# Patient Record
Sex: MALE | Race: ASIAN | HISPANIC OR LATINO | Employment: FULL TIME | URBAN - METROPOLITAN AREA
[De-identification: names, ages, dates, MRNs, and addresses within clinical notes are randomized per-mention and may not be internally consistent; named-entity substitution may affect disease eponyms.]

---

## 2017-04-24 ENCOUNTER — HOSPITAL ENCOUNTER (EMERGENCY)
Facility: HOSPITAL | Age: 52
Discharge: HOME | End: 2017-04-24
Attending: EMERGENCY MEDICINE
Payer: COMMERCIAL

## 2017-04-24 ENCOUNTER — TELEPHONE (OUTPATIENT)
Dept: OPHTHALMOLOGY | Facility: CLINIC | Age: 52
End: 2017-04-24

## 2017-04-24 VITALS
BODY MASS INDEX: 27.31 KG/M2 | DIASTOLIC BLOOD PRESSURE: 82 MMHG | HEIGHT: 64 IN | OXYGEN SATURATION: 99 % | WEIGHT: 160 LBS | RESPIRATION RATE: 18 BRPM | HEART RATE: 64 BPM | SYSTOLIC BLOOD PRESSURE: 132 MMHG | TEMPERATURE: 98 F

## 2017-04-24 DIAGNOSIS — S05.02XA BILATERAL CORNEAL ABRASIONS, INITIAL ENCOUNTER: Primary | ICD-10-CM

## 2017-04-24 DIAGNOSIS — S05.01XA BILATERAL CORNEAL ABRASIONS, INITIAL ENCOUNTER: Primary | ICD-10-CM

## 2017-04-24 PROCEDURE — 99283 EMERGENCY DEPT VISIT LOW MDM: CPT

## 2017-04-24 PROCEDURE — 25000003 PHARM REV CODE 250: Performed by: NURSE PRACTITIONER

## 2017-04-24 RX ORDER — ERYTHROMYCIN 5 MG/G
OINTMENT OPHTHALMIC
Qty: 1 TUBE | Refills: 0 | Status: SHIPPED | OUTPATIENT
Start: 2017-04-24

## 2017-04-24 RX ORDER — TETRACAINE HYDROCHLORIDE 5 MG/ML
2 SOLUTION OPHTHALMIC
Status: COMPLETED | OUTPATIENT
Start: 2017-04-24 | End: 2017-04-24

## 2017-04-24 RX ORDER — COLCHICINE 0.6 MG/1
0.6 TABLET ORAL DAILY
COMMUNITY

## 2017-04-24 RX ADMIN — FLUORESCEIN SODIUM 1 STRIP: 1 STRIP OPHTHALMIC at 08:04

## 2017-04-24 RX ADMIN — TETRACAINE HYDROCHLORIDE 2 DROP: 5 SOLUTION OPHTHALMIC at 08:04

## 2017-04-24 NOTE — ED TRIAGE NOTES
C/o eye burning, drainage, irritation princess at night x 3 months. Denies injury. Reports blurred vision.

## 2017-04-24 NOTE — DISCHARGE INSTRUCTIONS
Please return to the ED for any new or worsening symptoms: worsening vision or pain, chest pain, shortness of breath, loss of consciousness or any other concerns. Please follow up with ophthalmology within in the week. You may also call [phone] for the [facility] same day appointment line.

## 2017-04-24 NOTE — ED AVS SNAPSHOT
[facility]  [address] [place] [address]               Juan Titus   2017  8:28 AM   ED    Description:  Male : 1965   Department:  [facility]           Your Care was Coordinated By:     Provider Role From To    Petra Altman MD Attending Provider 17 --    Paola De Anda NP Nurse Practitioner 17 --      Reason for Visit     Eye Problem           Diagnoses this Visit        Comments    Bilateral corneal abrasions, initial encounter    -  Primary       ED Disposition     None           To Do List           Follow-up Information     Schedule an appointment as soon as possible for a visit with Rafa Colby MD.    Specialty:  Ophthalmology    Why:  Ophthalmology    Contact information:    [address] [place] [address] [phone]         These Medications        Disp Refills Start End    erythromycin (ROMYCIN) ophthalmic ointment 1 Tube 0 2017     Place a 1/2 inch ribbon of ointment into the lower eyelid to both eyes 3 times per day for 5 days      [facility]     [facility] Nurse Care Line -  Assistance  Unless otherwise directed by your provider, please contact [facility], our nurse care line that is available for  assistance.     Registered nurses in the [facility] provide: appointment scheduling, clinical advisement, health education, and other advisory services.  Call: [phone] (toll free)               Medications           Message regarding Medications     Verify the changes and/or additions to your medication regime listed below are the same as discussed with your clinician today.  If any of these changes or additions are incorrect, please notify your healthcare provider.        START taking these NEW medications        Refills    erythromycin (ROMYCIN) ophthalmic ointment 0    Sig: Place a 1/2 inch ribbon of ointment into the lower  "eyelid to both eyes 3 times per day for 5 days    Class: Print      These medications were administered today        Dose Freq    tetracaine HCl (PF) 0.5 % Drop 2 drop 2 drop ED 1 Time    Sig: Place 2 drops into the right eye ED 1 Time.    Class: Normal    Route: Right Eye    fluorescein ophthalmic strip 1 strip 1 strip ED 1 Time    Sig: Place 1 strip into the right eye ED 1 Time.    Class: Normal    Route: Right Eye           Verify that the below list of medications is an accurate representation of the medications you are currently taking.  If none reported, the list may be blank. If incorrect, please contact your healthcare provider. Carry this list with you in case of emergency.           Current Medications     colchicine 0.6 mg tablet Take 0.6 mg by mouth once daily.    erythromycin (ROMYCIN) ophthalmic ointment Place a 1/2 inch ribbon of ointment into the lower eyelid to both eyes 3 times per day for 5 days    ondansetron (ZOFRAN-ODT) 8 MG TbDL Take 1 tablet (8 mg total) by mouth every 6 (six) hours as needed.           Clinical Reference Information           Your Vitals Were     BP Pulse Temp Resp Height Weight    132/82 (BP Location: Right arm, Patient Position: Sitting) 64 97.8 °F (36.6 °C) (Oral) 18 5' 4" (1.626 m) 72.6 kg (160 lb)    SpO2 BMI             99% 27.46 kg/m2         Allergies as of 4/24/2017     No Known Allergies      Immunizations Administered on Date of Encounter - 4/24/2017     None      ED Micro, Lab, POCT     None      ED Imaging Orders     None        Discharge Instructions       Please return to the ED for any new or worsening symptoms: worsening vision or pain, chest pain, shortness of breath, loss of consciousness or any other concerns. Please follow up with ophthalmology within in the week. You may also call [phone] for the [facility] same day appointment line.    Discharge References/Attachments     CORNEAL ABRASION (ENGLISH)      Jossiessheela Sign-Up     Activating your " [facility] account is as easy as 1-2-3!     1) Visit [email or web address], select Sign Up Now, enter this activation code and your date of birth, then select Next.  [email or web address]  Expires: 6/8/2017  9:24 AM      2) Create a username and password to use when you visit [facility] in the future and select a security question in case you lose your password and select Next.    3) Enter your e-mail address and click Sign Up!    Additional Information  If you have questions, please e-mail [email or web address] or call [phone] to talk to our [facility] staff. Remember, [facility] is NOT to be used for urgent needs. For medical emergencies, dial 911.          [facility] complies with applicable Federal civil rights laws and does not discriminate on the basis of race, color, national origin, age, disability, or sex.        Language Assistance Services     ATTENTION: Language assistance services are available, free of charge. Please call [phone].      ATENCIÓN: Si habla español, tiene a fraser disposición servicios gratuitos de asistencia lingüística. Llame al [phone].     SAI Ý: N?u b?n nói Ti?ng Vi?t, có các d?ch v? h? tr? ngôn ng? mi?n phí dành cho b?n. G?i s? [phone].

## 2017-04-24 NOTE — ED PROVIDER NOTES
"Encounter Date: 4/24/2017    SCRIBE #1 NOTE: I, Debby Ferris, am scribing for, and in the presence of,  Paola De Anda NP. I have scribed the following portions of the note - Other sections scribed: HPI and ROS.       History     Chief Complaint   Patient presents with    Eye Problem     pt states " my eyes have been burning for 3 months"      Review of patient's allergies indicates:  No Known Allergies  HPI Comments: CC: Eye Problem    HPI: This 51 y.o male pt with a PMHx of gout presents to the ED with c/o bilateral eye pain with associated itching and redness. Symptoms are chronic, ongoing x3 months, and severe(10/10). Pain is described as itchy. Pt reports being seen at an outside facility for similar symptoms. Pt reports using eye drops, OTC allergy drops and prescribed unknown drops, without relief. He denies photophobia, headache, and light-headedness. There are no alleviating factors. Pt has no known allergies.     The history is provided by the patient. No  was used.     Past Medical History:   Diagnosis Date    Gout      Past Surgical History:   Procedure Laterality Date    APPENDECTOMY       Family History   Problem Relation Age of Onset    Family history unknown: Yes     Social History   Substance Use Topics    Smoking status: Never Smoker    Smokeless tobacco: None    Alcohol use No     Review of Systems   Constitutional: Negative for fever.   HENT: Negative for sore throat.    Eyes: Positive for pain, discharge, redness, itching and visual disturbance. Negative for photophobia.   Respiratory: Negative for shortness of breath.    Cardiovascular: Negative for chest pain.   Gastrointestinal: Negative for nausea.   Genitourinary: Negative for dysuria.   Musculoskeletal: Negative for back pain.   Skin: Negative for rash.   Neurological: Negative for weakness, light-headedness and headaches.   Hematological: Does not bruise/bleed easily.       Physical Exam   Initial " Vitals   BP Pulse Resp Temp SpO2   04/24/17 0817 04/24/17 0817 04/24/17 0817 04/24/17 0817 04/24/17 0817   132/82 64 18 97.8 °F (36.6 °C) 99 %     Physical Exam    Constitutional: Vital signs are normal. He appears well-developed and well-nourished.  Non-toxic appearance.   Eyes: Conjunctivae and EOM are normal. Pupils are equal, round, and reactive to light. Lids are everted and swept, no foreign bodies found. Right eye exhibits discharge (clear tearing). Right eye exhibits no exudate. No foreign body present in the right eye. Left eye exhibits no exudate. No foreign body present in the left eye.   Slit lamp exam:       The right eye shows corneal abrasion. The right eye shows no foreign body.        The left eye shows corneal abrasion. The left eye shows no foreign body.       Neck: Full passive range of motion without pain. Neck supple. No rigidity.   Cardiovascular: Normal rate, S1 normal, S2 normal and normal heart sounds. Exam reveals no gallop.    No murmur heard.  Pulmonary/Chest: Effort normal and breath sounds normal. No tachypnea. He has no decreased breath sounds. He has no wheezes. He has no rhonchi. He has no rales.   Abdominal: Soft. Normal appearance. There is no tenderness. There is no CVA tenderness, no tenderness at McBurney's point and negative Key's sign.   Neurological: He is alert and oriented to person, place, and time. GCS eye subscore is 4. GCS verbal subscore is 5. GCS motor subscore is 6.   Skin: Skin is warm, dry and intact. No rash noted.   Psychiatric: He has a normal mood and affect.         ED Course   Procedures  Labs Reviewed - No data to display          Medical Decision Making:   ED Management:  This is a 51 year old male who presents to the ED with complaints of bilateral eye pain, itching and tearing (R>L) x3 weeks. He reports no relief with allergy drops. On exam, there is no conjunctival injection or purulent drainage. On slit lamp examination with fluorescein staining,  there are bilateral corneal abrasions. No foreign bodies. Anterior chamber is clear. No evidence of angle closure glaucoma. Discharged home with prescription for erythromycin ointment and instructions for supportive care and follow up with ophthalmology. Return precautions given. Patient's case was discussed with Dr. Chan, who agrees with his plan of care.            Scribe Attestation:   Scribe #1: I performed the above scribed service and the documentation accurately describes the services I performed. I attest to the accuracy of the note.    Attending Attestation:     Physician Attestation Statement for NP/PA:   I discussed this assessment and plan of this patient with the NP/PA, but I did not personally examine the patient. The face to face encounter was performed by the NP/PA.    Other NP/PA Attestation Additions:      Medical Decision Makin y.o. male presents with bilateral eye pain and itching.  He notes this has been going on for several months, unrelieved by ALLERGY eyedrops.  On exam patient has bilateral corneal abrasions, likely from rubbing/itching.  Treated with erythromycin ophthalmic ointment, and plan to refer to ophthalmology. I have discussed this patient with mid-level provider and agree with physical exam, assessment and plan.        Physician Attestation for Scribe:  Physician Attestation Statement for Scribe #1: I, Paola De Anda NP, reviewed documentation, as scribed by Debby Ferris in my presence, and it is both accurate and complete.                 ED Course     Clinical Impression:   The encounter diagnosis was Bilateral corneal abrasions, initial encounter.    Disposition:   Disposition: Discharged  Condition: Stable       Paola De Anda NP  17 [id number]       Petra Altman MD  17 [id number]